# Patient Record
Sex: FEMALE | Race: WHITE
[De-identification: names, ages, dates, MRNs, and addresses within clinical notes are randomized per-mention and may not be internally consistent; named-entity substitution may affect disease eponyms.]

---

## 2017-01-29 ENCOUNTER — HOSPITAL ENCOUNTER (EMERGENCY)
Dept: HOSPITAL 25 - UCEAST | Age: 52
Discharge: HOME | End: 2017-01-29
Payer: COMMERCIAL

## 2017-01-29 VITALS — DIASTOLIC BLOOD PRESSURE: 88 MMHG | SYSTOLIC BLOOD PRESSURE: 123 MMHG

## 2017-01-29 DIAGNOSIS — J32.9: ICD-10-CM

## 2017-01-29 DIAGNOSIS — Z87.891: ICD-10-CM

## 2017-01-29 DIAGNOSIS — J98.01: Primary | ICD-10-CM

## 2017-01-29 PROCEDURE — G0463 HOSPITAL OUTPT CLINIC VISIT: HCPCS

## 2017-01-29 PROCEDURE — 99212 OFFICE O/P EST SF 10 MIN: CPT

## 2017-02-10 NOTE — UC
Dakota BAEZA Aidan, scribed for Starla Ordaz DO on 01/29/17 at 1004 .





Shortness of Breath HPI





- HPI Summary


HPI Summary: 


50 y/o female presents to the Urgent Care with a complaint of acute, moderate 

episodes of shortness of breath that began at 1200 yesterday while walking up a 

hill. Shortly after the first episode, the patient was given a nebulizer 

treatment and evaluated by a physician. Last night, she was unable to sleep as 

a result of her shortness of breath. Associated symptoms include a mild head 

cold with congestion and clear drainage, a nonproductive cough that causes 

chest discomfort, and a fever of 102. The patient denies any fever, chills, 

muscle aches, joint pain, rash, swelling in the legs, vomiting, nausea, or 

diarrhea. Hx of asthma that causes difficulty breathing and episodes of a 

similar SOB sensation.





- History of Current Complaint


Chief Complaint: UCRespiratory


Stated Complaint: CHEST CONGESTION


Time Seen by Provider: 01/29/17 08:26


Hx Obtained From: Patient, Family/Caretaker - 


Hx Last Menstrual Period: 50 y/o female


Pregnant?: No


Onset/Duration: Sudden Onset, Lasting Minutes, Resolved - Pt is not currently 

having an episode of shortness of breath


Timing: Intermittent Episodes Lasting:


Current Severity: Mild


Dyspnea At: Exertion - while walking up a hill AND at rest


Aggrevating Factors: Nothing - unknown


Alleviating Factors: Nothing - unknown


Associated Signs & Symptoms: Positive: Cough (Nonproductive) - that causes 

chest discomfort, Fever - of 102, Other - SOB, head cold with head congestion 

and clear drainage





- Risk Factors


Pulmonary Embolism: Smoking - former smoker


Cardiac: Smoking - former smoker


Tuberculosis: Smoking - former smoker





- Allergy/Home Medications


Allergies/Adverse Reactions: 


 Allergies











Allergy/AdvReac Type Severity Reaction Status Date / Time


 


No Known Allergies Allergy   Verified 03/31/16 16:50











Home Medications: 


 Home Medications





Dextromethorphan-Phenylephrine [Day Time Multi-Symptom Co 10-5-325 mg/15Ml]  01/ 29/17 [History]











PMH/Surg Hx/FS Hx/Imm Hx


Previously Healthy: Yes


Endocrine History Of: 


   Denies: Diabetes, Thyroid Disease


Cardiovascular History Of: 


   Denies: Cardiac Disorders, Hypertension


Respiratory History Of: 


   Denies: COPD, Asthma


GI/ History Of: 


   Denies: Ulcer





- Surgical History


Surgical History: None





- Family History


Known Family History: Positive: Cardiac Disease - MI


   Negative: Hypertension, Diabetes





- Social History


Occupation: Employed Full-time


Lives: Alone


Alcohol Use: Rare


Substance Use Type: None


Smoking Status (MU): Former Smoker


Type: Cigarettes


Amount Used/How Often: 30 years


Length of Time of Smoking/Using Tobacco: 15 months


Have You Smoked in the Last Year: No





Review of Systems


Constitutional: Fever - of 102


Skin: Negative


Eyes: Negative


ENT: Other - head cold with head congestion and clear drainage


Respiratory: Shortness Of Breath, Cough - cough causes chest discomfort


Cardiovascular: Negative


Gastrointestinal: Negative


Genitourinary: Negative


Motor: Negative


Neurovascular: Negative


Musculoskeletal: Negative


Neurological: Negative


Psychological: Negative


All Other Systems Reviewed And Are Negative: Yes





Physical Exam


Triage Information Reviewed: Yes


Appearance: Well-Appearing, No Pain Distress, Well-Nourished


Vital Signs: 


 Initial Vital Signs











Temp  102 F   01/29/17 08:21


 


Pulse  108   01/29/17 08:21


 


Resp  20   01/29/17 08:21


 


BP  123/88   01/29/17 08:21


 


Pulse Ox  97   01/29/17 08:21











Vital Signs Reviewed: Yes


Eyes: Positive: Conjunctiva Clear.  Negative: Discharge


ENT: Positive: Hearing grossly normal


Neck exam: Normal


Neck: Positive: Supple


Respiratory: Positive: Normal breath sounds, No accessory muscle use, Wheezing 

- diffuse wheezing in all feilds.  Negative: Lungs clear, No respiratory 

distress


Cardiovascular: Positive: RRR, No Murmur


Musculoskeletal Exam: Normal


Neurological: Positive: Alert, Muscle Tone Normal


Psychological: Positive: Age Appropriate Behavior


Skin Exam: Normal, Other - warm, dry, normal color





Re-Evaluation





- Re-Evaluation


  ** First Eval


Re-Evaluation Time: 09:50 - Impreoved, decreased wheezing


Change: Improved





  ** Second Eval


Re-Evaluation Time: 10:02 - The patient's lungs were clear on re-examination.


Change: Improved





Shortness of Breath Dx





- Course


Course Of Treatment: On second re-evaluation, the patient's lungs were clear.





- Differential Dx/Diagnosis


Differential Diagnosis/HQI/PQRI: Bronchitis, Pneumonia, Other - sinustis


Provider Diagnoses: bronchospasm, sinusitis





Discharge





- Discharge Plan


Condition: Stable


Disposition: HOME


Prescriptions: 


Albuterol HFA INHALER* [Ventolin HFA Inhaler*] 2 puff INH Q4H PRN #1 mdi


 PRN Reason: Sob/Wheezing


Cefdinir 300 mg PO BID #10 cap


guaiFENesin ER TAB [Mucinex*] 600 mg PO BID PRN #1 box


 PRN Reason: Cough


guaiFENesin/CODIEN 100MG-10MG* [Robitussin AC 100Mg-10Mg*] 5 - 10 ml PO BEDTIME 

PRN #100 udc MDD 10ml


 PRN Reason: Cough


predniSONE TAB* [Deltasone TAB*] 40 mg PO DAILY #8 tab


Patient Education Materials:  Sinusitis (ED), Bronchospasm (ED)


Referrals: 


Adair Giang MD [Primary Care Provider] -  (FOLLOW UP IN 3-5 DAYS OR 

SOONER IF SYMPTOMS WORSEN)


Additional Instructions: 


TRY USING THE NETTI POT IN THE MORNINGS AS DISCUSSED.  YOU MUST ALWAYS USE 

CLEAN WATER.





REMEMBER, POSTURE IS AN IMPORTANT FACTOR IN SINUS DRAINAGE.  MOVE YOUR NECK, 

BREATHE.





INHALED BRONCHODILATORS:


     You have received a prescription for an inhaled bronchodilator -- a 

medication which stimulates the airways in the lung to dilate.  This improves 

the flow of air in asthma, bronchitis, and emphysema.


     These medicines have some similarity to adrenaline, and can cause similar 

side effects:  shakiness, racing heart, and a sense of nervousness.  These side 

effects decrease with time.  Contact your doctor if these side effects are 

severe.


     Do not over-use the medicine.  Too-frequent use of the inhaler may make it 

ineffective.  Call your doctor if the inhaler is not controlling your symptoms 

at the prescribed doses.





COUGH-SUPPRESSANT & EXPECTORANT MEDICATION:


     You are to use a cough medication as needed for relief of symptoms.  This 

medicine is a combination of an expectorant (to make the mucous thinner and 

more easily "coughed up") and a cough suppressant (to reduce the frequency of 

coughing).


     The cough-suppressant medicine is related to narcotics.  You may 

experience mild nausea and sleepiness.  Some patients who are very sensitive to 

narcotics may have stomach pain from this medicine. Taking the medicine with 

food reduces these side effects.  Do not drive or work with machinery until you 

know how this medicine affects you.


     The expectorant should have no side effects.  Iodine-containing 

expectorants (such as organidin) should not be taken by persons with active 

thyroid disease unless approved by your doctor.


     Call the doctor if you develop shortness of breath, hives, rash, itching, 

lightheadedness, or severe nausea and vomiting.





EXPECTORANT MEDICATION:


     An expectorant medicine has been prescribed.  This type of drug makes 

mucous thinner, helping the sinuses, nose, and bronchial tubes to remain free 

of pus and mucous.  Expectorants make a cough less severe and more comfortable, 

and help infected sinuses drain.


     In general, antihistamines defeat the purpose of the expectorant by making 

mucous thicker.  They should be avoided unless specifically recommended by your 

physician.





CORTICOSTEROID MEDICATION:


     You have been given a medicine of the cortisone class.  This medication is 

used to control inflammation or allergy.  It is usually only given for a short 

period of time, until the acute process subsides.


     There are usually no side effects from short-term use of cortisone-like 

medications.  Some persons feel an increased sense of well-being and are not 

sleepy at bedtime.  Long-term use of cortisone medications is best avoided, 

unless required for a severe condition. If your condition does not remit, or 

relapses after the course of corticosteroid medication, you should consult your 

physician.


     Contact the physician if you develop lightheadedness, black or tarry stools

, swelling of the legs, or significant rapid change in weight.





ANTIBIOTIC THERAPY:


     You have been given an antibiotic prescription.  It's important that you 

take all the medication, unless instructed otherwise by your physician.  

Failure to complete the entire course can result in relapse of your condition.


     Common side effects of antibiotics include nausea, intestinal cramping, or 

diarrhea.  Women may develop vaginal yeast infections, and babies can get yeast 

(thrush) in the mouth following the use of antibiotics.  Contact your physician 

if you develop significant side effects from this medication.


     Allergy to this antibiotic can result in hives, wheezing, faintness, or 

itching.  If symptoms of allergy occur, stop the medication and call the doctor.





ANY TIME YOU TAKE AN ANTIBIOTIC, IT IS IMPORTANT TO REPLENISH THE BODY'S 

BALANCE OF "GOOD" BACTERIA BY EATING HIGH QUALITY CULTURED FOOD SUCH AS YOGURT, 

SAURKRAUT OR PIERRE CHI AND/OR TAKING A PROBIOTIC SUPPLEMENT.








The documentation as recorded by the Dakota phillips Aidan accurately reflects 

the service I personally performed and the decisions made by me, Starla Ordaz DO.

## 2017-08-20 ENCOUNTER — HOSPITAL ENCOUNTER (EMERGENCY)
Dept: HOSPITAL 25 - UCEAST | Age: 52
Discharge: HOME | End: 2017-08-20
Payer: COMMERCIAL

## 2017-08-20 VITALS — DIASTOLIC BLOOD PRESSURE: 78 MMHG | SYSTOLIC BLOOD PRESSURE: 127 MMHG

## 2017-08-20 DIAGNOSIS — J40: Primary | ICD-10-CM

## 2017-08-20 DIAGNOSIS — Z87.891: ICD-10-CM

## 2017-08-20 PROCEDURE — G0463 HOSPITAL OUTPT CLINIC VISIT: HCPCS

## 2017-08-20 PROCEDURE — 96372 THER/PROPH/DIAG INJ SC/IM: CPT

## 2017-08-20 PROCEDURE — 99213 OFFICE O/P EST LOW 20 MIN: CPT

## 2017-08-20 NOTE — UC
Respiratory Complaint HPI





- HPI Summary


HPI Summary: 





51 YEAR OLD FEMALE PRESENTS WITH SEVERE COUGH AND WHEEZING. 





- History of Current Complaint


Stated Complaint: COUGH


Time Seen by Provider: 08/20/17 18:43


Hx Obtained From: Patient


Hx Last Menstrual Period: 52 y/o female


Onset/Duration: Sudden Onset


Timing: Constant


Severity Initially: Moderate


Severity Currently: Moderate


Pain Scale Used: 0-10 Numeric - 6


Character: Cough: Nonproductive


Alleviating Factors: Bronchodilator


Associated Signs And Symptoms: Positive: Wheezing, Nasal Congestion





- Allergies/Home Medications


Allergies/Adverse Reactions: 


 Allergies











Allergy/AdvReac Type Severity Reaction Status Date / Time


 


No Known Allergies Allergy   Verified 08/20/17 18:45











Home Medications: 


 Home Medications





Budesonide/Formote 160/4.5(NF) [Symbicort 160/4.5 (NF)] 1 dose INH ONCE 08/20/ 17 [History Confirmed 08/20/17]











PMH/Surg Hx/FS Hx/Imm Hx


Previously Healthy: Yes





- Surgical History


Surgical History: None





- Family History


Known Family History: Positive: None, Cardiac Disease - MI


   Negative: Hypertension, Diabetes





- Social History


Alcohol Use: Rare


Substance Use Type: None


Smoking Status (MU): Former Smoker


Type: Cigarettes


Amount Used/How Often: 30 years


Length of Time of Smoking/Using Tobacco: 15 months


Have You Smoked in the Last Year: No





Review of Systems


Constitutional: Negative


Skin: Negative


Eyes: Negative


ENT: Negative


Respiratory: Shortness Of Breath, Cough


Cardiovascular: Negative


Gastrointestinal: Negative


Genitourinary: Negative


Motor: Negative


Neurovascular: Negative


Musculoskeletal: Negative


Neurological: Negative


Psychological: Negative


All Other Systems Reviewed And Are Negative: Yes





Physical Exam


Triage Information Reviewed: Yes


Eye Exam: Normal


ENT Exam: Normal


Dental Exam: Normal


Neck exam: Normal


Neck: Positive: 1


Respiratory: Positive: Rhonchi, Wheezing


Cardiovascular Exam: Normal


Abdominal Exam: Normal


Musculoskeletal Exam: Normal


Neurological Exam: Normal


Psychological Exam: Normal


Skin Exam: Normal





Respiratory Course/Dx





- Differential Dx/Diagnosis


Provider Diagnoses: BRONCHITIS





Discharge





- Discharge Plan


Condition: Stable


Disposition: HOME


Prescriptions: 


Albuterol HFA INHALER* [Ventolin HFA Inhaler*] 1 puff INH Q6H PRN #1 mdi


 PRN Reason: Wheezing


Amoxicillin/Clavulanate TAB* [Augmentin *] 875 mg PO BID #20 tab


guaiFENesin/CODIEN 100MG-10MG* [Robitussin AC 100Mg-10Mg*] 5 ml PO Q6H PRN #120 

ml MDD 20 ML


 PRN Reason: Cough


predniSONE TAB* [Deltasone TAB*] 40 mg PO DAILY #10 tab


Patient Education Materials:  Acute Cough (ED), Acute Bronchitis (ED)


Referrals: 


Yasmine Cordova MD [Primary Care Provider] -

## 2017-09-30 ENCOUNTER — HOSPITAL ENCOUNTER (EMERGENCY)
Dept: HOSPITAL 25 - UCEAST | Age: 52
Discharge: HOME | End: 2017-09-30
Payer: COMMERCIAL

## 2017-09-30 VITALS — DIASTOLIC BLOOD PRESSURE: 69 MMHG | SYSTOLIC BLOOD PRESSURE: 118 MMHG

## 2017-09-30 DIAGNOSIS — Z87.891: ICD-10-CM

## 2017-09-30 DIAGNOSIS — J32.9: Primary | ICD-10-CM

## 2017-09-30 DIAGNOSIS — R03.0: ICD-10-CM

## 2017-09-30 DIAGNOSIS — J45.909: ICD-10-CM

## 2017-09-30 PROCEDURE — 99212 OFFICE O/P EST SF 10 MIN: CPT

## 2017-09-30 PROCEDURE — 96372 THER/PROPH/DIAG INJ SC/IM: CPT

## 2017-09-30 PROCEDURE — G0463 HOSPITAL OUTPT CLINIC VISIT: HCPCS

## 2017-10-01 NOTE — UC
Marco A BAEZA Rebecca, scribed for Starla Ordaz DO on 09/30/17 at 2147 .





Respiratory Complaint HPI





- HPI Summary


HPI Summary: 


Pt is a 52 y/o F who presents to [facility] c/o nonproductive cough and SOB for 2 

days. Sx aggravated and alleviated by nothing. Additionally c/o rhinorrhea, 

nasal congestion, sore throat and HA secondary to cough. Denies ear pain, 

abdominal pain, N/V, CP, dysuria and rash. Currently is not experiencing any 

pain, ranked pain as 0/10. PMHx seasonal allergies and asthma for which she has 

used steroids in the past. Prior similar episode about 5 weeks for which she 

was treated with steroid, increased albuterol and Abx. Reports that this is the 

third instance of similar symptoms this year. Seasonal allergies have been 

aggravated for a few weeks. Reports recent illness which tends to trigger 

asthma attacks. 








- History of Current Complaint


Chief Complaint: UCRespiratory


Stated Complaint: ASTHMA ATTACK


Time Seen by Provider: 09/30/17 21:10


Hx Obtained From: Patient


Hx Last Menstrual Period: 52 y/o female


Pregnant?: No


Onset/Duration: Lasting Days - 2 days, Still Present


Severity Initially: Moderate


Severity Currently: Moderate


Pain Intensity: 0


Pain Scale Used: 0-10 Numeric


Character: Cough: Nonproductive


Aggravating Factors: Nothing


Alleviating Factors: Nothing


Associated Signs And Symptoms: Positive: Dyspnea, Wheezing, URI, Nasal 

Congestion, Sinus Discomfort.  Negative: Fever, Pleuritic Chest Pain, Hoarseness





- Allergies/Home Medications


Allergies/Adverse Reactions: 


 Allergies











Allergy/AdvReac Type Severity Reaction Status Date / Time


 


No Known Allergies Allergy   Verified 09/30/17 21:07














PMH/Surg Hx/FS Hx/Imm Hx





- Additional Past Medical History


Additional PMH: 





NEGATIVE: CAD


Respiratory History: Asthma, Other


Other Respiratory History: Seasonal allergies





- Surgical History


Surgical History: None





- Family History


Known Family History: Positive: Cardiac Disease - MI


   Negative: Hypertension, Diabetes





- Social History


Lives: With Family


Alcohol Use: Rare


Substance Use Type: None


Smoking Status (MU): Former Smoker


Type: Cigarettes


Amount Used/How Often: 30 years


Length of Time of Smoking/Using Tobacco: 15 months


Have You Smoked in the Last Year: No





Review of Systems


Constitutional: Negative


Skin: Negative


Eyes: Negative


ENT: Sore Throat, Nasal Discharge


Respiratory: Shortness Of Breath, Cough - Nonproductive


Cardiovascular: Negative


Gastrointestinal: Negative


Genitourinary: Negative


Motor: Negative


Neurovascular: Negative


Musculoskeletal: Negative


Neurological: Headache - secondary to cough


Psychological: Negative


All Other Systems Reviewed And Are Negative: Yes





- Comments


Additional Review of Systems Comments: 





NEGATIVE: Ear pain, abdominal pain, N/V, CP, dysuria and rash.





Physical Exam


Triage Information Reviewed: Yes


Vital Signs: 


 Initial Vital Signs











Temp  97.6 F   09/30/17 21:04


 


Pulse  118   09/30/17 21:04


 


Resp  30   09/30/17 21:04


 


BP  136/77   09/30/17 21:04


 


Pulse Ox  97   09/30/17 21:04











Vital Signs Reviewed: Yes





- Additional Comments


Appearance: Well-Appearing, No Pain Distress, Well-Nourished


Eyes: Conjunctiva clear, no discharge


ENT: Hearing grossly normal, no muffled/hoarse voice. TMs normal, negative 

tonsillar swelling, negative tonsillar exudate, negative trismus. Positive pale

, boggy nasal mucosa, suborbital congestion


Neck: Normal, Supple


Respiratory/Lung Sounds: Lungs clear, Normal breath sounds, No respiratory 

distress, No accessory muscle use


Cardiovascular: RRR, No murmur


Musculoskeletal: Normal 


Neurological: Alert, muscle tone normal 


Psychiatric: Normal, age appropriate behavior


Skin: Normal, Warm, Dry, Normal color





[facility] Diagnostic Evaluation





- Laboratory


O2 Sat by Pulse Oximetry: 97





Re-Evaluation





- Re-Evaluation


  ** First Eval


Re-Evaluation Time: 23:15


Change: Improved


Comment: Pt reports significant subjective improvement in the ease of 

breathing. TMs normal, negative tonsillar swelling, negative tonsillar exudate, 

negative trismus, pale boggy nasal mucosa, suborbital congestion noted, 

pharyngeal erythema, no lymphadenopathy, non-tender neck, minimal tenderness 

over the R maxillary sinus.





Respiratory Course/Dx





- Course


Course Of Treatment: Pt is a 52 y/o F who presents to [facility] c/o nonproductive 

cough without blood and SOB for 2 days. Additionally c/o rhinorrhea, nasal 

congestion, sore throat and HA secondary to cough. Denies ear pain, abdominal 

pain, N/V, CP, dysuria and rash. Currently is not experiencing any pain, ranked 

pain as 0/10. PMHx seasonal allergies and asthma for which she has used 

steroids in the past. Prior similar episode about 5 weeks for which she was 

treated with steroid, increased albuterol and Abx. Reports that this is the 

third instance of similar symptoms this year. Seasonal allergies have been 

aggravated for a few weeks. Reports recent illness which tends to trigger 

asthma attacks. In the [facility] course, pt received Ventolin and Atrovent sx and 

Solu-Medrol which improved sx. She will be D/C to home with Dx of Asthma, 

sinusitis, allergies and elevated blood pressure without diagnosis of 

hypertension with Rx for Zithromax and Deltasone and a follow up with her PCP. 

She understands and agrees.  High BP noted.





- Differential Dx/Diagnosis


Differential Diagnosis/HQI/PQRI: Asthma, Bronchitis, Sinusitis


Provider Diagnoses: Asthma.  Sinusitis.  Allergies.  Elevated blood pressure 

without diagnosis of hypertension.





Discharge





- Discharge Plan


Condition: Stable


Disposition: HOME


Prescriptions: 


Azithromycin TAB* [Zithromax TAB (Z-JOMAR) 250 mg #6 tabs] 0 mg PO .SEE 

INSTRUCTIONS #6 tab


predniSONE TAB* [Deltasone TAB*] 40 mg PO DAILY #8 tab


Patient Education Materials:  Asthma (ED), Sinusitis (ED), Allergies (ED)


Referrals: 


Yasmine Cordova MD [Primary Care Provider] - 


Additional Instructions: 


TRY USING THE NETTI POT IN THE MORNINGS AS DISCUSSED.  YOU MUST ALWAYS USE 

CLEAN WATER.





REMEMBER, POSTURE IS AN IMPORTANT FACTOR IN SINUS DRAINAGE.  MOVE YOUR NECK, 

BREATHE.





INHALED BRONCHODILATORS:


     You have received a prescription for an inhaled bronchodilator -- a 

medication which stimulates the airways in the lung to dilate.  This improves 

the flow of air in asthma, bronchitis, and emphysema.


     These medicines have some similarity to adrenaline, and can cause similar 

side effects:  shakiness, racing heart, and a sense of nervousness.  These side 

effects decrease with time.  Contact your doctor if these side effects are 

severe.


     Do not over-use the medicine.  Too-frequent use of the inhaler may make it 

ineffective.  Call your doctor if the inhaler is not controlling your symptoms 

at the prescribed doses.








CORTICOSTEROID MEDICATION:


     You have been given a medicine of the cortisone class.  This medication is 

used to control inflammation or allergy.  It is usually only given for a short 

period of time, until the acute process subsides.


     There are usually no side effects from short-term use of cortisone-like 

medications.  Some persons feel an increased sense of well-being and are not 

sleepy at bedtime.  Long-term use of cortisone medications is best avoided, 

unless required for a severe condition. If your condition does not remit, or 

relapses after the course of corticosteroid medication, you should consult your 

physician.


     Contact the physician if you develop lightheadedness, black or tarry stools

, swelling of the legs, or significant rapid change in weight.





ANTIBIOTICS ARE NOT CURRENTLY INDICATED FOR YOUR CONDITION.  hOWEVER, IF YOUR 

SYMPTOMS WORSEN OR PERSIST FOR OVER THE NEXT 3-5 DAYS, YOU CAN TAKE THE 

FOLLOWING MEDICATION:





AZITHROMYCIN:


     Azithromycin (Zithromax) is a broad spectrum antibiotic in the same class 

as erythromycin.  It can treat a variety of bacterial infections, but is most 

frequently used for respiratory infections.


     Azithromycin is extremely long-lasting.  It accumulates in body tissues 

and continues to kill bacteria for many days.


     In order to improve absorption, Azithromycin should be taken at least one 

hour before or two hours after a meal.  It does not have the same strong 

tendency to upset the stomach as erythromycin and is usually very well 

tolerated.


     Patients who have had a rash or other true allergic reactions to 

erythromycin should not take this medication.  Call if you develop 

gastrointestinal distress, severe diarrhea, rash, hives, itching, or shortness 

of breath





ANYTIME YOU TAKE AN ANTIBIOTIC, IT IS IMPORTANT TO REPLENISH THE BODY'S SUPPLY 

OF "GOOD BACTERIA."  YOU CAN GET GOOD BACTERIA FROM HIGH QUALITY CULTURED FOODS 

SUCH AS LOCAL YOGURT, SOUR KRAUT, PIERRE POWER, NATURALLY FERMENTED PICKLES AND 

PROBIOTIC DRINKS.  YOU CAN ALSO GET GOOD BACTERIA FROM A PROBIOTIC SUPPLEMENT. 





Your blood pressure was elevated at this visit. That does not mean you have 

hypertension, it is probably due to your current condition. Please follow up 

with your primary care provider. 





The documentation as recorded by the Marco A phillips Rebecca accurately 

reflects the service I personally performed and the decisions made by me, Starla Ordaz DO.

## 2019-04-17 ENCOUNTER — HOSPITAL ENCOUNTER (EMERGENCY)
Dept: HOSPITAL 25 - UCEAST | Age: 54
Discharge: HOME | End: 2019-04-17
Payer: COMMERCIAL

## 2019-04-17 VITALS — SYSTOLIC BLOOD PRESSURE: 114 MMHG | DIASTOLIC BLOOD PRESSURE: 62 MMHG

## 2019-04-17 DIAGNOSIS — J45.909: ICD-10-CM

## 2019-04-17 DIAGNOSIS — Z87.891: ICD-10-CM

## 2019-04-17 DIAGNOSIS — J01.00: Primary | ICD-10-CM

## 2019-04-17 DIAGNOSIS — R00.0: ICD-10-CM

## 2019-04-17 PROCEDURE — G0463 HOSPITAL OUTPT CLINIC VISIT: HCPCS

## 2019-04-17 PROCEDURE — 99212 OFFICE O/P EST SF 10 MIN: CPT

## 2019-04-17 NOTE — UC
Respiratory Complaint HPI





- HPI Summary


HPI Summary: 


53-year-old female with history of asthma presents with 5-6 day history of 

nasal congestion, sinus pressure, and sore throat.  States symptoms began as a 

mild sore throat and then quickly progressed into nasal congestion and left 

maxillary sinus pain and pressure.  States she has been getting out thick, green

, blood-tinged discharge from her nose. Also reports occasional non-productive 

cough. Has taken DayQuil and NyQuil with little relief in symptoms.  Denies 

fever, chills, ear pain, dysphagia, chest pain, shortness of breath, or 

wheezing. 








- History of Current Complaint


Chief Complaint: UCRespiratory


Stated Complaint: SINUS/EAR PAIN


Time Seen by Provider: 04/17/19 10:02


Hx Obtained From: Patient


Hx Last Menstrual Period: 52 y/o female


Pain Intensity: 8





- Allergies/Home Medications


Allergies/Adverse Reactions: 


 Allergies











Allergy/AdvReac Type Severity Reaction Status Date / Time


 


No Known Allergies Allergy   Verified 04/17/19 10:01














PMH/Surg Hx/FS Hx/Imm Hx


Respiratory History: Asthma





- Surgical History


Surgical History: None





- Family History


Known Family History: Positive: None, Cardiac Disease - MI


   Negative: Hypertension, Diabetes





- Social History


Occupation: Employed Full-time


Lives: With Family


Alcohol Use: Rare


Substance Use Type: None


Smoking Status (MU): Former Smoker


Type: Cigarettes


Amount Used/How Often: 30 years


Length of Time of Smoking/Using Tobacco: 15 months


Have You Smoked in the Last Year: No





Review of Systems


All Other Systems Reviewed And Are Negative: Yes


Constitutional: Negative: Fever, Chills


Eyes: Negative: Drainage, Eye Redness


ENT: Positive: Sore Throat, Nasal Discharge, Sinus Congestion, Sinus Pain/

Tenderness.  Negative: Ear Ache


Respiratory: Positive: Cough.  Negative: Shortness Of Breath


Cardiovascular: Negative: Palpitations, Chest Pain


Gastrointestinal: Negative: Abdominal Pain, Vomiting, Diarrhea, Nausea


Genitourinary: Positive: Negative


Musculoskeletal: Positive: Negative


Neurological: Positive: Negative


Is Patient Immunocompromised?: No





Physical Exam





- Summary


Physical Exam Summary: 


GENERAL APPEARANCE: Well developed, well nourished, alert and cooperative, and 

appears to be in no acute distress.





EYES: Conjunctiva clear. No drainage. 





EARS: External auditory canals and tympanic membranes clear, hearing grossly 

intact.





NOSE: Moderate nasal congestion with mucosal erythema and edema. No nasal 

discharge noted. Left maxillary sinus tenderness.





THROAT: Pharyngeal cobblestoning. No tonsilar inflammation, swelling, exudate, 

or lesions. Uvula midline. Oral cavity normal. Teeth and gingiva in good 

general condition.





NECK: Neck supple, non-tender without lymphadenopathy.





CARDIAC: Normal S1 and S2. No S3, S4 or murmurs. Rhythm is regular. There is no 

peripheral edema, cyanosis or pallor. Extremities are warm and well perfused. 

Capillary refill is less than 2 seconds. Peripheral pulses intact.





LUNGS: Clear to auscultation without rales, rhonchi, wheezing or diminished 

breath sounds.





ABDOMEN: Positive bowel sounds. Soft, nondistended, nontender. No guarding or 

rebound. No masses or hepatosplenomegally.





MUSKULOSKELETAL: ROM intact to all extremities. No joint erythema or 

tenderness. Normal muscular development. Normal gait.





SKIN: Skin normal color, texture and turgor with no lesions or eruptions.





Triage Information Reviewed: Yes


Vital Signs: 


 Initial Vital Signs











Temp  99 F   04/17/19 09:58


 


Pulse  110   04/17/19 09:58


 


Resp  20   04/17/19 09:58


 


BP  114/62   04/17/19 09:58


 


Pulse Ox  99   04/17/19 09:58











Vital Signs Reviewed: Yes





Respiratory Course/Dx





- Course


Course Of Treatment: 


53-year-old female with history of asthma presents with 5-6 day history of 

nasal congestion, sinus pressure, and sore throat.  States symptoms began as a 

mild sore throat and then quickly progressed into nasal congestion and left 

maxillary sinus pain and pressure.  States she has been getting out thick, green

, blood-tinged discharge from her nose. Also reports occasional non-productive 

cough.  Has taken DayQuil and NyQuil with little relief in symptoms.  Denies 

fever, chills, ear pain, dysphagia, chest pain, shortness of breath, or 

wheezing.  Afebrile.  Mildly tachycardic otherwise vital signs stable.  Exam 

was remarkable for moderate nasal congestion with mucosal erythema and edema, 

left maxillary sinus tenderness, and pharyngeal cobblestoning.  Considering the 

progressive worsening of her symptoms as well as her underlying asthma I am 

choosing to treat her for an acute sinus infection with Augmentin 875 mg twice 

a day as well as symptomatic treatment including saline rinses and fluticasone 

nasal spray.  She is to follow-up with her primary care provider in 5-7 days if 

symptoms do not improve.  Anticipatory guidance warning symptoms are reviewed 

with the patient.  Verbalizes understanding of care.








- Differential Dx/Diagnosis


Differential Diagnosis/HQI/PQRI: Asthma, Bronchitis, Lower Resp Infection, 

Sinusitis


Provider Diagnosis: 


 Acute maxillary sinusitis








Discharge





- Sign-Out/Discharge


Documenting (check all that apply): Patient Departure


All imaging exams completed and their final reports reviewed: No Studies





- Discharge Plan


Condition: Stable


Disposition: HOME


Prescriptions: 


Amoxicillin/Clavulanate TAB* [Augmentin *] 875 mg PO BID #20 tab


Fluticasone NASAL SPRAY 50MCG* [Flonase NASAL SPRAY 50MCG*] 2 spray BOTH NARES 

DAILY #1 btl


Patient Education Materials:  Sinusitis (ED)


Referrals: 


Deep Nixon MD [Primary Care Provider] - 5 Days


Additional Instructions: 


Your history and exam are consistent with a sinus infection. We will start you 

on an antibiotic for the infection.





Take Augmentin 875 mg 1 tab twice a day for 10 days. Take with food to avoid 

upset stomach. Be sure to complete the entire course even if feeling better.





Drink plenty of fluids to avoid dehydration especially if you are running any 

fever.





Use a saline rinse kit such as Neti Pot or NeilMed at least twice a day to help 

thin secretions and promote drainage of the sinuses.





Use fluticasone (Flonase) nasal spray 2 sprays each nostril once daily.





Take over the counter acetaminophen (Tylenol) or ibuprofen (Advil, Motrin) 

according to directions as needed for pain or fever.





Follow up with your primary care provider in 5-7 days if symptoms persist.





Seek immediate medical attention in the emergency room if you have fever 

greater than 100.5 F despite taking acetaminophen or ibuprofen, have chest pain

, difficulty breathing, are unable to swallow, or have any worsening of 

symptoms.





- Billing Disposition and Condition


Condition: STABLE


Disposition: Home